# Patient Record
Sex: MALE | Race: WHITE | NOT HISPANIC OR LATINO | URBAN - NONMETROPOLITAN AREA
[De-identification: names, ages, dates, MRNs, and addresses within clinical notes are randomized per-mention and may not be internally consistent; named-entity substitution may affect disease eponyms.]

---

## 2017-04-21 PROBLEM — H40.1231: Noted: 2017-04-21

## 2017-04-21 PROBLEM — H10.423: Noted: 2017-04-21

## 2019-02-08 ENCOUNTER — IMPORTED ENCOUNTER (OUTPATIENT)
Dept: URBAN - NONMETROPOLITAN AREA CLINIC 1 | Facility: CLINIC | Age: 69
End: 2019-02-08

## 2019-02-08 NOTE — PATIENT DISCUSSION
Refraction only - Discussed diagnosis in detail with patient - New glasses Rx given today - Continue to monitor - RTC A/S ----------------------previous notes -------------------Low Tension Glaucoma OU - Discussed diagnosis in detail with patient- Appears stable on exam today. - OCT done previously stable findings OU. - VF done today shows Reliable and normal fields OU stable from previous. - Optos done previously stable from previous - Gonio done today grade lV with light pigment OU - IOP stable 12 OU - Pach done last visit: 565  OS- C/D ratio is 0.7 OD 0.6 OS- Continue Latanoprost QHS OU- Continue to monitor every 6 months. PVD OU- Discussed findings of exam in detail with the patient. - The risk of retinal detachment in patients with PVDs was discussed with the patient and the warning signs of retinal detachment were carefully reviewed with the patient. - The patient was warned to return to the office or contact the ophthalmologist on call immediately if they experience signs of retinal detachment or changes in vision noted from today.  - Continue to monitorCR Scar OS- Discussed diagnosis with patient- No changes on examination today- MonitorDES OU- Discussed diagnosis in detail with patient- Discussed signs and symptoms of progression- Recommend patient drinking plenty of water and starting Omega 3’s - Recommend Refresh or Systane  throughout the day- Consider Restasis or plugs in the future if no improvement- Continue to monitorPseudophakia OU- Discussed diagnosis in detail with patient-  Both intraocular lenses in place and stable- Continue to monitor; 's Notes: MR 9/15/15 (LRI)DFE  4/25/18OCT disc  4/25/18Optos  8/7/2015VF  10/25/18Gonio  10/25/18PACH 10/20/16 Sarwat Sanz

## 2019-04-25 ENCOUNTER — IMPORTED ENCOUNTER (OUTPATIENT)
Dept: URBAN - NONMETROPOLITAN AREA CLINIC 1 | Facility: CLINIC | Age: 69
End: 2019-04-25

## 2019-04-25 PROCEDURE — 92133 CPTRZD OPH DX IMG PST SGM ON: CPT

## 2019-04-25 PROCEDURE — 92014 COMPRE OPH EXAM EST PT 1/>: CPT

## 2019-04-25 NOTE — PATIENT DISCUSSION
Low Tension Glaucoma OU - Discussed diagnosis in detail with patient- Appears stable on exam today. - OCT done today stable findings OU with good RNFL noted. - VF done previously shows Reliable and normal fields OU stable from previous. - Optos done previously stable from previous - Gonio done previously grade lV with light pigment OU - IOP stable at 11 OD and 10 OS. - Pach done last visit: 565  OS- C/D ratio is 0.7 OD 0.6 OS- Continue Latanoprost QHS OU- Continue to monitor every 6 months. PVD OU- Discussed findings of exam in detail with the patient. - The risk of retinal detachment in patients with PVDs was discussed with the patient and the warning signs of retinal detachment were carefully reviewed with the patient. - The patient was warned to return to the office or contact the ophthalmologist on call immediately if they experience signs of retinal detachment or changes in vision noted from today.  - Continue to monitorCR Scar OS- Discussed diagnosis with patient- No changes on examination today- MonitorDES OU- Discussed diagnosis in detail with patient- Discussed signs and symptoms of progression- Recommend patient drinking plenty of water and starting Omega 3’s - Recommend Refresh or Systane  throughout the day- Consider Restasis or plugs in the future if no improvement- Continue to monitorPseudophakia OU- Discussed diagnosis in detail with patient-  Both intraocular lenses in place and stable- Continue to monitor; 's Notes: MR 9/15/15 (LRI)DFE  4/25/19OCT disc  4/25/19Optos  8/7/2015VF  10/25/18Gonio  10/25/18PACH 10/20/16 Jayme Moran

## 2019-10-24 ENCOUNTER — IMPORTED ENCOUNTER (OUTPATIENT)
Dept: URBAN - NONMETROPOLITAN AREA CLINIC 1 | Facility: CLINIC | Age: 69
End: 2019-10-24

## 2019-10-24 PROCEDURE — 92014 COMPRE OPH EXAM EST PT 1/>: CPT

## 2019-10-24 PROCEDURE — 92083 EXTENDED VISUAL FIELD XM: CPT

## 2019-10-24 NOTE — PATIENT DISCUSSION
Low Tension Glaucoma OU - Discussed diagnosis in detail with patient- Appears stable on exam today. - OCT done previously stable findings OU with good RNFL noted. - VF done today shows Reliable and normal fields OU stable from previous. - Optos done previously stable from previous - Gonio done previously grade lV with light pigment OU - IOP stable at 12 OU- Pach done last visit: 565  OS- C/D ratio is 0.7 OD 0.6 OS- Continue Latanoprost QHS OU- Continue to monitor every 6 months. PVD OU- Discussed findings of exam in detail with the patient. - The risk of retinal detachment in patients with PVDs was discussed with the patient and the warning signs of retinal detachment were carefully reviewed with the patient. - The patient was warned to return to the office or contact the ophthalmologist on call immediately if they experience signs of retinal detachment or changes in vision noted from today.  - Continue to monitorCR Scar OS- Discussed diagnosis with patient- No changes on examination today- MonitorDES OU- Discussed diagnosis in detail with patient- Discussed signs and symptoms of progression- Recommend patient drinking plenty of water and starting Omega 3’s - Recommend Refresh or Systane  throughout the day- Consider Restasis or plugs in the future if no improvement- Continue to monitorPseudophakia OU-  Discussed diagnosis in detail with patient-  Both intraocular lenses in place and stable- Pt still complaining of mild glare complaint reassured that he is stable without changes in his lenses- Continue to monitor; 's Notes: MR 9/15/15 (LRI)DFE  4/25/19OCT disc  4/25/19Optos  8/7/2015VF  10/24/19Gonio  10/25/18PACH 10/20/16 Nalini Veliz

## 2020-05-29 ENCOUNTER — IMPORTED ENCOUNTER (OUTPATIENT)
Dept: URBAN - NONMETROPOLITAN AREA CLINIC 1 | Facility: CLINIC | Age: 70
End: 2020-05-29

## 2020-05-29 PROCEDURE — 92133 CPTRZD OPH DX IMG PST SGM ON: CPT

## 2020-05-29 PROCEDURE — 92014 COMPRE OPH EXAM EST PT 1/>: CPT

## 2020-05-29 NOTE — PATIENT DISCUSSION
Low Tension Glaucoma OU - Discussed diagnosis in detail with patient- Appears stable on exam today. - OCT done today OD shows Superior [employer] thinning and OS shows Superior [employer] Thinning but poor scan. OCT today was created as new. Patient has previous scans - VF done previously shows Reliable and normal fields OU stable from previous. - Optos done previously stable from previous - Gonio done previously grade lV with light pigment OU - IOP stable at 12 OU- Pach done last visit: 565  OS- C/D ratio is 0.7 OD 0.6 OS- Continue Latanoprost QHS OU- Continue to monitor every 6 months. PVD OU- Discussed findings of exam in detail with the patient. - The risk of retinal detachment in patients with PVDs was discussed with the patient and the warning signs of retinal detachment were carefully reviewed with the patient. - The patient was warned to return to the office or contact the ophthalmologist on call immediately if they experience signs of retinal detachment or changes in vision noted from today.  - Continue to monitorCR Scar OS- Discussed diagnosis with patient- No changes on examination today- MonitorDES OU- Discussed diagnosis in detail with patient- Discussed signs and symptoms of progression- Recommend patient drinking plenty of water and starting Omega 3’s - Recommend Refresh or Systane  throughout the day- Consider Restasis or plugs in the future if no improvement- Continue to monitorPseudophakia OU-  Discussed diagnosis in detail with patient-  Both intraocular lenses in place and stable- Pt still complaining of mild glare complaint reassured that he is stable without changes in his lenses- Continue to monitor Presbyopia OU - Discussed diagnosis in detail with patient - New glasses RX given today - Continue to monitor; 's Notes: MR 9/15/15 (LRI)DFE  4/25/19OCT disc  5/29/20Optos  8/7/2015VF  10/24/19Gonio  10/25/18PACH 10/20/16 Rema Shaikh

## 2020-06-02 ENCOUNTER — IMPORTED ENCOUNTER (OUTPATIENT)
Dept: URBAN - NONMETROPOLITAN AREA CLINIC 1 | Facility: CLINIC | Age: 70
End: 2020-06-02

## 2020-06-02 PROBLEM — H31.092: Noted: 2020-06-02

## 2020-06-02 PROBLEM — H10.423: Noted: 2020-06-02

## 2020-06-02 PROBLEM — H04.123: Noted: 2020-06-02

## 2020-06-02 PROBLEM — H40.1231: Noted: 2020-06-02

## 2020-06-02 PROBLEM — H43.813: Noted: 2020-06-02

## 2020-06-02 PROBLEM — H11.31: Noted: 2020-06-02

## 2020-06-02 PROBLEM — Z96.1: Noted: 2020-06-02

## 2020-06-02 PROCEDURE — 99213 OFFICE O/P EST LOW 20 MIN: CPT

## 2020-06-02 NOTE — PATIENT DISCUSSION
Subconjuctival Hemorrhage OD - Discussed diagnosis in detail with patient - Discussed signs and symptons - Recommend cool compresses through out the day- Recommend REfresh or Systane through out the day samples of Refresh Relieva given today- Continue to monitor --------------------------------previous notes-----------------------------Low Tension Glaucoma OU - Discussed diagnosis in detail with patient- Appears stable on exam today. - OCT done today OD shows Superior [employer] thinning and OS shows Superior [employer] Thinning but poor scan. OCT today was created as new. Patient has previous scans - VF done previously shows Reliable and normal fields OU stable from previous. - Optos done previously stable from previous - Gonio done previously grade lV with light pigment OU - IOP stable at 12 OU- Pach done last visit: 565  OS- C/D ratio is 0.7 OD 0.6 OS- Continue Latanoprost QHS OU- Continue to monitor every 6 months. PVD OU- Discussed findings of exam in detail with the patient. - The risk of retinal detachment in patients with PVDs was discussed with the patient and the warning signs of retinal detachment were carefully reviewed with the patient. - The patient was warned to return to the office or contact the ophthalmologist on call immediately if they experience signs of retinal detachment or changes in vision noted from today.  - Continue to monitorCR Scar OS- Discussed diagnosis with patient- No changes on examination today- MonitorDES OU- Discussed diagnosis in detail with patient- Discussed signs and symptoms of progression- Recommend patient drinking plenty of water and starting Omega 3’s - Recommend Refresh or Systane  throughout the day- Consider Restasis or plugs in the future if no improvement- Continue to monitorPseudophakia OU-  Discussed diagnosis in detail with patient-  Both intraocular lenses in place and stable- Pt still complaining of mild glare complaint reassured that he is stable without changes in his lenses- Continue to monitor Presbyopia OU - Discussed diagnosis in detail with patient - New glasses RX given today - Continue to monitor; 's Notes: MR 9/15/15 (LRI)DFE  4/25/19OCT disc  5/29/20Optos  8/7/2015VF  10/24/19Gonio  10/25/18PACH 10/20/16 Jonathan Resendez

## 2021-01-08 ENCOUNTER — IMPORTED ENCOUNTER (OUTPATIENT)
Dept: URBAN - NONMETROPOLITAN AREA CLINIC 1 | Facility: CLINIC | Age: 71
End: 2021-01-08

## 2021-01-08 PROBLEM — H43.813: Noted: 2021-01-08

## 2021-01-08 PROBLEM — H04.123: Noted: 2021-01-08

## 2021-01-08 PROBLEM — H40.1231: Noted: 2021-01-08

## 2021-01-08 PROBLEM — H31.092: Noted: 2021-01-08

## 2021-01-08 PROBLEM — Z96.1: Noted: 2021-01-08

## 2021-01-08 PROCEDURE — 92083 EXTENDED VISUAL FIELD XM: CPT

## 2021-01-08 PROCEDURE — 92014 COMPRE OPH EXAM EST PT 1/>: CPT

## 2021-01-08 NOTE — PATIENT DISCUSSION
Low Tension Glaucoma OU - Discussed diagnosis in detail with patient- Appears stable on exam today. - OCT done previously OD shows Superior [employer] thinning and OS shows Superior [employer] Thinning but poor scan. OCT today was created as new. Patient has previous scans - VF done today OD shows Good field and Normal and OS shows Good field wit h Borderline Inferior Arcuate - Optos done previously stable from previous - Gonio done previously grade lV with light pigment OU - IOP stable at 15 OU- Pach done last visit: 565  OS- C/D ratio is 0.7 OD 0.6 OS- Continue Latanoprost QHS OU- Continue to monitor PVD OU- Discussed findings of exam in detail with the patient. - The risk of retinal detachment in patients with PVDs was discussed with the patient and the warning signs of retinal detachment were carefully reviewed with the patient. - The patient was warned to return to the office or contact the ophthalmologist on call immediately if they experience signs of retinal detachment or changes in vision noted from today.  - Continue to monitorCR Scar OS- Discussed diagnosis with patient- No changes on examination today- MonitorDES OU- Discussed diagnosis in detail with patient- Discussed signs and symptoms of progression- Recommend patient drinking plenty of water and starting Omega 3’s - Recommend Refresh or Systane  throughout the day- Continue to monitorPseudophakia OU-  Discussed diagnosis in detail with patient- Pt still complaining of mild glare complaint reassured that he is stable without changes in his lenses- Continue to monitor Presbyopia OU - Discussed diagnosis in detail with patient - Continue to monitor; 's Notes: MR 9/15/15 (LRI)DFE  4/25/19OCT disc  5/29/20Optos  8/7/2015VF  1/8/21Gonio  10/25/18PACH 10/20/16 - Chasity Gonsales

## 2021-07-09 ENCOUNTER — IMPORTED ENCOUNTER (OUTPATIENT)
Dept: URBAN - NONMETROPOLITAN AREA CLINIC 1 | Facility: CLINIC | Age: 71
End: 2021-07-09

## 2021-07-09 PROCEDURE — 92133 CPTRZD OPH DX IMG PST SGM ON: CPT

## 2021-07-09 PROCEDURE — 99214 OFFICE O/P EST MOD 30 MIN: CPT

## 2021-07-09 NOTE — PATIENT DISCUSSION
Low Tension Glaucoma OU - Discussed diagnosis in detail with patient- Appears stable on exam today. - OCT done today OD shows Superior [employer] thinning and Borderline Temporal NFL thinning and OS shows Borderline Superior [employer] thinning - VF done previously OD shows Good field and Normal and OS shows Good field with Borderline Inferior Arcuate - Optos done previously stable from previous - Gonio done previously grade lV with light pigment OU - IOP stable at 16 OU- Pach done last visit: 565  OS- C/D ratio is 0.7 OD 0.6 OS- Continue Latanoprost QHS OU- Continue to monitor - RTC 6 months F/U with VF and Complete PVD OU- Discussed findings of exam in detail with the patient. - The risk of retinal detachment in patients with PVDs was discussed with the patient and the warning signs of retinal detachment were carefully reviewed with the patient. - The patient was warned to return to the office or contact the ophthalmologist on call immediately if they experience signs of retinal detachment or changes in vision noted from today.  - Continue to monitorCR Scar OS- Discussed diagnosis with patient- No changes on examination today- MonitorDES OU- Discussed diagnosis in detail with patient- Discussed signs and symptoms of progression- Recommend patient drinking plenty of water and starting Omega 3’s - Recommend Refresh or Systane  throughout the day- Continue to monitorPseudophakia OU-  Discussed diagnosis in detail with patient- Pt still complaining of mild glare complaint reassured that he is stable without changes in his lenses- Continue to monitor Presbyopia OU - Discussed diagnosis in detail with patient - Continue to monitor; 's Notes: MR 9/15/15 (LRI)DFE  4/25/19OCT disc  7/9/21Optos  8/7/2015VF  1/8/21Gonio  10/25/18PACH 10/20/16 - Yair Levy

## 2022-04-10 ASSESSMENT — PACHYMETRY
OS_CT_UM: 560; ADJ: WNL
OD_CT_UM: 565; ADJ: WNL
OD_CT_UM: 565; ADJ: WNL
OS_CT_UM: 560; ADJ: WNL
OD_CT_UM: 565; ADJ: WNL
OS_CT_UM: 560; ADJ: WNL
OD_CT_UM: 565; ADJ: WNL
OS_CT_UM: 560; ADJ: WNL
OD_CT_UM: 565; ADJ: WNL
OS_CT_UM: 560; ADJ: WNL
OD_CT_UM: 565; ADJ: WNL
OD_CT_UM: 565; ADJ: WNL

## 2022-04-10 ASSESSMENT — VISUAL ACUITY
OD_SC: 20/20
OS_SC: 20/20 BLURRY
OD_SC: 20/20
OS_SC: 20/20
OD_SC: 20/20
OS_GLARE: 20/40-
OD_SC: 20/20
OS_SC: 20/20
OS_SC: 20/20-1
OS_SC: 20/20
OD_GLARE: 20/25
OD_SC: J1
OD_SC: 20/20
OS_SC: J1

## 2022-04-10 ASSESSMENT — TONOMETRY
OD_IOP_MMHG: 15
OS_IOP_MMHG: 13
OD_IOP_MMHG: 16
OD_IOP_MMHG: 12
OS_IOP_MMHG: 16
OS_IOP_MMHG: 12
OS_IOP_MMHG: 10
OD_IOP_MMHG: 12
OD_IOP_MMHG: 12
OD_IOP_MMHG: 11
OS_IOP_MMHG: 15
OS_IOP_MMHG: 12

## 2022-04-22 ENCOUNTER — FOLLOW UP (OUTPATIENT)
Dept: URBAN - NONMETROPOLITAN AREA CLINIC 1 | Facility: CLINIC | Age: 72
End: 2022-04-22

## 2022-04-22 DIAGNOSIS — H40.1231: ICD-10-CM

## 2022-04-22 PROCEDURE — 92015 DETERMINE REFRACTIVE STATE: CPT

## 2022-04-22 PROCEDURE — 99213 OFFICE O/P EST LOW 20 MIN: CPT

## 2022-04-22 PROCEDURE — 92083 EXTENDED VISUAL FIELD XM: CPT

## 2022-04-22 ASSESSMENT — TONOMETRY
OS_IOP_MMHG: 14
OD_IOP_MMHG: 14

## 2022-04-22 ASSESSMENT — VISUAL ACUITY
OS_CC: 20/20
OD_CC: 20/20

## 2022-12-20 ENCOUNTER — FOLLOW UP (OUTPATIENT)
Dept: URBAN - NONMETROPOLITAN AREA CLINIC 1 | Facility: CLINIC | Age: 72
End: 2022-12-20

## 2022-12-20 PROCEDURE — 99214 OFFICE O/P EST MOD 30 MIN: CPT

## 2022-12-20 PROCEDURE — 92250 FUNDUS PHOTOGRAPHY W/I&R: CPT

## 2022-12-20 ASSESSMENT — TONOMETRY
OD_IOP_MMHG: 14
OS_IOP_MMHG: 14

## 2022-12-20 ASSESSMENT — VISUAL ACUITY
OD_CC: 20/20
OS_CC: 20/20

## 2023-06-26 ENCOUNTER — FOLLOW UP (OUTPATIENT)
Dept: URBAN - NONMETROPOLITAN AREA CLINIC 1 | Facility: CLINIC | Age: 73
End: 2023-06-26

## 2023-06-26 DIAGNOSIS — H40.1231: ICD-10-CM

## 2023-06-26 PROCEDURE — 92083 EXTENDED VISUAL FIELD XM: CPT

## 2023-06-26 PROCEDURE — 99214 OFFICE O/P EST MOD 30 MIN: CPT

## 2023-06-26 PROCEDURE — 92133 CPTRZD OPH DX IMG PST SGM ON: CPT

## 2023-06-26 ASSESSMENT — VISUAL ACUITY
OS_CC: 20/20
OU_CC: 20/20
OD_CC: 20/20

## 2023-06-26 ASSESSMENT — TONOMETRY
OS_IOP_MMHG: 14
OD_IOP_MMHG: 14

## 2024-03-21 ENCOUNTER — FOLLOW UP (OUTPATIENT)
Dept: URBAN - NONMETROPOLITAN AREA CLINIC 1 | Facility: CLINIC | Age: 74
End: 2024-03-21

## 2024-03-21 DIAGNOSIS — H31.092: ICD-10-CM

## 2024-03-21 DIAGNOSIS — H40.1231: ICD-10-CM

## 2024-03-21 DIAGNOSIS — H52.4: ICD-10-CM

## 2024-03-21 PROCEDURE — 92133 CPTRZD OPH DX IMG PST SGM ON: CPT

## 2024-03-21 PROCEDURE — 92015 DETERMINE REFRACTIVE STATE: CPT

## 2024-03-21 PROCEDURE — 92250 FUNDUS PHOTOGRAPHY W/I&R: CPT

## 2024-03-21 PROCEDURE — 99213 OFFICE O/P EST LOW 20 MIN: CPT

## 2024-03-21 ASSESSMENT — VISUAL ACUITY
OS_CC: 20/20-1
OU_CC: 20/20-1
OD_CC: 20/20

## 2024-03-21 ASSESSMENT — TONOMETRY
OS_IOP_MMHG: 13
OD_IOP_MMHG: 13

## 2024-09-23 ENCOUNTER — FOLLOW UP (OUTPATIENT)
Dept: URBAN - NONMETROPOLITAN AREA CLINIC 1 | Facility: CLINIC | Age: 74
End: 2024-09-23

## 2024-09-23 DIAGNOSIS — H43.813: ICD-10-CM

## 2024-09-23 DIAGNOSIS — H40.1231: ICD-10-CM

## 2024-09-23 DIAGNOSIS — H31.092: ICD-10-CM

## 2024-09-23 PROCEDURE — 99213 OFFICE O/P EST LOW 20 MIN: CPT

## 2024-09-23 PROCEDURE — 92133 CPTRZD OPH DX IMG PST SGM ON: CPT

## 2024-09-23 PROCEDURE — 92083 EXTENDED VISUAL FIELD XM: CPT

## 2025-05-15 ENCOUNTER — COMPREHENSIVE EXAM (OUTPATIENT)
Age: 75
End: 2025-05-15

## 2025-05-15 DIAGNOSIS — H52.4: ICD-10-CM

## 2025-05-15 DIAGNOSIS — H40.1231: ICD-10-CM

## 2025-05-15 DIAGNOSIS — H31.092: ICD-10-CM

## 2025-05-15 PROCEDURE — 92250 FUNDUS PHOTOGRAPHY W/I&R: CPT

## 2025-05-15 PROCEDURE — 99213 OFFICE O/P EST LOW 20 MIN: CPT

## 2025-05-15 PROCEDURE — 92015 DETERMINE REFRACTIVE STATE: CPT
